# Patient Record
Sex: MALE | Race: WHITE | NOT HISPANIC OR LATINO | URBAN - METROPOLITAN AREA
[De-identification: names, ages, dates, MRNs, and addresses within clinical notes are randomized per-mention and may not be internally consistent; named-entity substitution may affect disease eponyms.]

---

## 2025-06-12 ENCOUNTER — PATIENT OUTREACH (OUTPATIENT)
Dept: CARE COORDINATION | Facility: CLINIC | Age: 60
End: 2025-06-12

## 2025-06-12 NOTE — PROGRESS NOTES
[facility] member [facility] ED outreach.    Spoke with Mr. Herron. I introduced myself and purpose of call.  Confirmed :   No new or worsening symptoms. States he woke up with redness/swelling/pain in lower calf. 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  Member scheduled appt with hematologist 25.   Offered assistance in scheduling NP appt with PCP. Declined, he wants to wait until seen by hematologist.  Member has Rx given at discharge. Elequis 5mg.  Aware of open gap in care for CRC.  Available if needed in future.  Janessa KUMAR, [facility]  Office Phone: [phone]     Engagement  Call Start Time: 1037 (2025 10:41 AM)    Medications  Medications reviewed with patient/caregiver?: Yes (2025 10:41 AM)  Is the patient having any side effects they believe may be caused by any medication additions or changes?: No (2025 10:41 AM)  Does the patient have all medications ordered at discharge?: Yes (2025 10:41 AM)  Care Management Interventions: No intervention needed (2025 10:41 AM)  Is the patient taking all medications as directed (includes completed medication regime)?: Yes (2025 10:41 AM)    Appointments  Does the patient have a primary care provider?: No (2025 10:41 AM)  Care Management Interventions: Advised patient to make appointment (Offered to assist in scheduling. Member declined. Will wait until seen by hematologist on 25.) (2025 10:41 AM)  Care Management Interventions: -- (Scheduled with hematologist 25.) (2025 10:41 AM)    Patient Teaching  Does the patient have access to their discharge instructions?: Yes (2025 10:41 AM)  What is the patient's perception of their health status since discharge?: Improving (2025 10:41 AM)